# Patient Record
Sex: MALE | Race: WHITE | NOT HISPANIC OR LATINO | Employment: FULL TIME | URBAN - METROPOLITAN AREA
[De-identification: names, ages, dates, MRNs, and addresses within clinical notes are randomized per-mention and may not be internally consistent; named-entity substitution may affect disease eponyms.]

---

## 2021-12-16 ENCOUNTER — OFFICE VISIT (OUTPATIENT)
Dept: URGENT CARE | Facility: PHYSICIAN GROUP | Age: 41
End: 2021-12-16
Payer: COMMERCIAL

## 2021-12-16 VITALS
OXYGEN SATURATION: 97 % | BODY MASS INDEX: 28.7 KG/M2 | TEMPERATURE: 97.1 F | HEART RATE: 60 BPM | SYSTOLIC BLOOD PRESSURE: 122 MMHG | DIASTOLIC BLOOD PRESSURE: 58 MMHG | HEIGHT: 71 IN | RESPIRATION RATE: 16 BRPM | WEIGHT: 205 LBS

## 2021-12-16 DIAGNOSIS — R21 SKIN RASH: ICD-10-CM

## 2021-12-16 DIAGNOSIS — T78.40XA ALLERGIC REACTION, INITIAL ENCOUNTER: ICD-10-CM

## 2021-12-16 DIAGNOSIS — L03.116 CELLULITIS OF LEFT LOWER EXTREMITY: ICD-10-CM

## 2021-12-16 PROCEDURE — 99214 OFFICE O/P EST MOD 30 MIN: CPT | Performed by: STUDENT IN AN ORGANIZED HEALTH CARE EDUCATION/TRAINING PROGRAM

## 2021-12-16 RX ORDER — CLINDAMYCIN HYDROCHLORIDE 300 MG/1
CAPSULE ORAL
COMMUNITY
Start: 2021-12-14 | End: 2021-12-16

## 2021-12-16 RX ORDER — CEPHALEXIN 500 MG/1
500 CAPSULE ORAL 4 TIMES DAILY
Qty: 20 CAPSULE | Refills: 0 | Status: SHIPPED | OUTPATIENT
Start: 2021-12-16 | End: 2021-12-21

## 2021-12-16 RX ORDER — TRIAMCINOLONE ACETONIDE 1 MG/G
15 CREAM TOPICAL 2 TIMES DAILY
Qty: 1 EACH | Refills: 0 | Status: SHIPPED | OUTPATIENT
Start: 2021-12-16 | End: 2021-12-23

## 2021-12-16 RX ORDER — DOXYCYCLINE 100 MG/1
100 CAPSULE ORAL 2 TIMES DAILY
Qty: 10 CAPSULE | Refills: 0 | Status: SHIPPED | OUTPATIENT
Start: 2021-12-16 | End: 2021-12-21

## 2021-12-16 RX ORDER — PREDNISONE 5 MG/1
40 TABLET ORAL DAILY
Qty: 40 TABLET | Refills: 0 | Status: SHIPPED | OUTPATIENT
Start: 2021-12-16 | End: 2021-12-21

## 2021-12-16 RX ORDER — LORATADINE 10 MG/1
10 CAPSULE, LIQUID FILLED ORAL DAILY
Qty: 14 CAPSULE | Refills: 0 | Status: SHIPPED | OUTPATIENT
Start: 2021-12-16 | End: 2021-12-30

## 2021-12-16 ASSESSMENT — ENCOUNTER SYMPTOMS
MYALGIAS: 1
NAUSEA: 0
HEADACHES: 0
SORE THROAT: 0
COUGH: 0
EYE PAIN: 0
FEVER: 0
VOMITING: 0

## 2021-12-16 NOTE — PROGRESS NOTES
"Subjective     Kassie Garner is a 41 y.o. male who presents with Rash (right hand, back of legs, torso; 1x wk) and Rash (left shin; 4x days)            Rash  Pertinent negatives include no cough, eye pain, fever, sore throat or vomiting.   Patient went to [place] for a hiking trip and stayed in a hotel 12/1-12/4. He thought he may have some bug bites over there. He developed itching papules like rash on his left lower leg and R wrist the next day. His pcp prescribed him prednisone. However his rash seemed to get bigger the next day so he stopped it. However the rash has been spread and his LLE was more swelling and painful. His PCP prescribed po clindamycin and  Topical  Mupirocin 2 days ago. However, his rash continue to getting worse and developed vesicles on the top of rash of LLE. He also notices papules like rash on his inguinal area, lower legs, dorsal hand and itching. He reported body aches  No f/c/n/v, HA, cough, sore throat, sob, congestion, ear pain, numbness,   No hx of allergies      Review of Systems   Constitutional: Negative for fever.   HENT: Negative for ear pain and sore throat.    Eyes: Negative for pain.   Respiratory: Negative for cough.    Cardiovascular: Negative for chest pain.   Gastrointestinal: Negative for nausea and vomiting.   Genitourinary: Negative for dysuria.   Musculoskeletal: Positive for myalgias.   Skin: Positive for itching and rash.   Neurological: Negative for headaches.              Objective     /58 (BP Location: Left arm, Patient Position: Sitting, BP Cuff Size: Adult)   Pulse 60   Temp 36.2 °C (97.1 °F) (Temporal)   Resp 16   Ht 1.803 m (5' 11\")   Wt 93 kg (205 lb)   SpO2 97%   BMI 28.59 kg/m²      Physical Exam  HENT:      Head: Normocephalic.      Nose: Nose normal.      Mouth/Throat:      Mouth: Mucous membranes are moist.   Eyes:      Extraocular Movements: Extraocular movements intact.   Cardiovascular:      Rate and Rhythm: Normal rate.      " Pulses: Normal pulses.   Pulmonary:      Effort: Pulmonary effort is normal.   Abdominal:      Palpations: Abdomen is soft.   Musculoskeletal:         General: Normal range of motion.      Cervical back: Normal range of motion.   Skin:     General: Skin is warm.             Comments: LLE Erythema, swelling and tenderness, Vesicles.   Red papules on dorsal hands (one linear rash), BL ingular area, lower legs   Neurological:      General: No focal deficit present.      Mental Status: He is alert.                             Assessment & Plan        1. Cellulitis of left lower extremity  Worsening skin rash/swelling with clindamycin p.o. and topical mupirocin.  Discontinue mupirocin and clindamycin.  Start new antibiotics.  Patient was advised to go to ER if no improvement.  He may need IV antibiotics if he fails outpatient  treatment    - cephALEXin (KEFLEX) 500 MG Cap; Take 1 Capsule by mouth 4 times a day for 5 days.  Dispense: 20 Capsule; Refill: 0  - doxycycline (MONODOX) 100 MG capsule; Take 1 Capsule by mouth 2 times a day for 5 days.  Dispense: 10 Capsule; Refill: 0    2. Skin rash  History of recent outdoor exposure to possible bugs/ iv poison  Still develop new rash 10 days after the exposure.   Possible allergy to unknown bugs/plants, possible allergy to clindamycin   Advised the patient to stay hygiene, sterilize beddings, clothes, avoid new detergent  Discontinue clindamycin    - triamcinolone acetonide (KENALOG) 0.1 % Cream; Apply 15 g topically 2 times a day for 7 days.  Dispense: 1 Each; Refill: 0  - Loratadine (CLARITIN) 10 MG Cap; Take 10 mg by mouth every day for 14 days.  Dispense: 14 Capsule; Refill: 0  - predniSONE (DELTASONE) 5 MG Tab; Take 8 Tablets by mouth every day for 5 days.  Dispense: 40 Tablet; Refill: 0    3. Allergic reaction, initial encounter  Worsening erythema and new developed vesicles after topic mupirocin use.  Advised the patient  to stop using it.  Prescribed 5 days of  prednisone.     - predniSONE (DELTASONE) 5 MG Tab; Take 8 Tablets by mouth every day for 5 days.  Dispense: 40 Tablet; Refill: 0    Patient was advised to go to ER or urgent care if symptoms are not improved or getting worse            encephalopathy. improving.  continue present care. 24 video EEG.  consider MRI brain wo gabe.

## 2024-09-27 ENCOUNTER — HOSPITAL ENCOUNTER (OUTPATIENT)
Facility: MEDICAL CENTER | Age: 44
End: 2024-09-27
Attending: PHYSICIAN ASSISTANT
Payer: COMMERCIAL

## 2024-09-27 ENCOUNTER — OFFICE VISIT (OUTPATIENT)
Dept: URGENT CARE | Facility: CLINIC | Age: 44
End: 2024-09-27
Payer: COMMERCIAL

## 2024-09-27 VITALS
WEIGHT: 177 LBS | RESPIRATION RATE: 16 BRPM | BODY MASS INDEX: 25.34 KG/M2 | DIASTOLIC BLOOD PRESSURE: 68 MMHG | HEART RATE: 60 BPM | HEIGHT: 70 IN | OXYGEN SATURATION: 99 % | SYSTOLIC BLOOD PRESSURE: 118 MMHG | TEMPERATURE: 97.9 F

## 2024-09-27 DIAGNOSIS — N50.89 MALE GENITAL LESION: ICD-10-CM

## 2024-09-27 PROCEDURE — 99213 OFFICE O/P EST LOW 20 MIN: CPT | Performed by: PHYSICIAN ASSISTANT

## 2024-09-27 PROCEDURE — 3078F DIAST BP <80 MM HG: CPT | Performed by: PHYSICIAN ASSISTANT

## 2024-09-27 PROCEDURE — 87529 HSV DNA AMP PROBE: CPT

## 2024-09-27 PROCEDURE — 3074F SYST BP LT 130 MM HG: CPT | Performed by: PHYSICIAN ASSISTANT

## 2024-09-27 RX ORDER — DOXYCYCLINE 100 MG/1
100 CAPSULE ORAL 2 TIMES DAILY
COMMUNITY
Start: 2024-09-11 | End: 2024-09-18

## 2024-09-27 RX ORDER — VALACYCLOVIR HYDROCHLORIDE 1 G/1
1000 TABLET, FILM COATED ORAL 2 TIMES DAILY
Qty: 14 TABLET | Refills: 1 | Status: SHIPPED | OUTPATIENT
Start: 2024-09-27 | End: 2024-10-04

## 2024-09-27 ASSESSMENT — ENCOUNTER SYMPTOMS
VOMITING: 0
EYE DISCHARGE: 0
NAUSEA: 0
EYE REDNESS: 0
FEVER: 0

## 2024-09-27 NOTE — PROGRESS NOTES
Subjective     Kassie Garner is a 44 y.o. male who presents with Sore (Sores in genital area x 1 month/Was tested for STIs but came back negative)            HPI    This is a new problem.   The patient presents to clinic complaining of genital lesions x 3-4 weeks.   The patient states he developed blisterlike lesions on the shaft of his penis approximately 3-4 weeks ago.  The patient states he was seen by his PCP on 9/7/2024, and had STD screening done at that time.  The patient states he was tested for chlamydia and gonorrhea via a urine sample. The patient  states he also had blood work done to test for HSV, HIV, and syphilis.  The patient states all of his STD screening came back negative.  The patient states the blister-like lesions have mostly healed over the past several weeks. However, he has developed a new blister lesion. The patient states he did attend [facility] and had increased sexual intercourse with 1 female partner. The patient reports no urinary symptoms.  No dysuria.  No hematuria.  He also reports no penile discharge.  No testicular pain/swelling.  The patient states he was experiencing an intermittent pain/soreness to his urethra that would happen at random and was not associated with urination.  The patient states his primary care provider did prescribe doxycycline twice daily for 7 days, which she completed approximately 8 days ago.  The patient reports no prior history of herpes.  The patient has not taken any OTC medications for his current symptoms.    PMH:  has no past medical history on file.  MEDS:   Current Outpatient Medications:     amoxapine (ASENDIN) 25 MG TABS, Take 25 mg by mouth every evening. (Patient not taking: Reported on 9/27/2024), Disp: , Rfl:   ALLERGIES: No Known Allergies  SURGHX: History reviewed. No pertinent surgical history.  SOCHX:  reports that he has never smoked. He has never used smokeless tobacco. He reports that he does not currently use alcohol. He  "reports that he does not currently use drugs.  FH: Family history was reviewed, no pertinent findings to report    Review of Systems   Constitutional:  Negative for fever.   Eyes:  Negative for discharge and redness.   Gastrointestinal:  Negative for nausea and vomiting.   Genitourinary:  Negative for dysuria and hematuria.              Objective     /68 (BP Location: Left arm, Patient Position: Sitting, BP Cuff Size: Adult)   Pulse 60   Temp 36.6 °C (97.9 °F) (Temporal)   Resp 16   Ht 1.778 m (5' 10\")   Wt 80.3 kg (177 lb)   SpO2 99%   BMI 25.40 kg/m²      Physical Exam  Constitutional:       General: He is not in acute distress.     Appearance: Normal appearance. He is well-developed. He is not ill-appearing.   HENT:      Head: Normocephalic and atraumatic.      Right Ear: External ear normal.      Left Ear: External ear normal.   Eyes:      Extraocular Movements: Extraocular movements intact.      Conjunctiva/sclera: Conjunctivae normal.   Cardiovascular:      Rate and Rhythm: Normal rate.   Pulmonary:      Effort: Pulmonary effort is normal.   Genitourinary:     Comments:   A single vesicular lesion was present to the shaft of the penis with slight surrounding erythema.  No additional lesions.  No pain/tenderness.  No swelling.  No erythema.  No secondary signs of infection.  A chaperone was present throughout the duration of the  exam.  Musculoskeletal:         General: Normal range of motion.      Cervical back: Normal range of motion and neck supple.   Skin:     General: Skin is warm and dry.   Neurological:      Mental Status: He is alert and oriented to person, place, and time.                  Progress:  HSV PCR - pending     HSV IGG - pending              Assessment & Plan        Assessment & Plan  Male genital lesion    Orders:    HSV-1 AND HSV-2 SUBTYPE, PCR; Future    HSV 1/2 IGG W/ TYPE SPECIFIC RFLX; Future    valacyclovir (VALTREX) 1 GM Tab; Take 1 Tablet by mouth 2 times a day for 7 " days.            The patient's presenting symptoms and physical exam findings are consistent with a genital lesion.  The patient's general physical exam today in clinic was normal.  On the patient's  exam, a single vesicular lesion was present to the shaft of the penis with slight surrounding erythema.  The patient had no additional lesions.  He also had no pain/tenderness, swelling, erythema, or secondary signs of infection.  A chaperone was present throughout the duration of the patient's  exam.  The patient is nontoxic and appears in no acute distress.  The patient's vital signs are stable and within normal limits.  He is afebrile today in clinic.  A viral swab was obtained today in clinic to further evaluate the patient's vesicular lesion.  Based on the patient's presenting symptoms and physical exam findings, I do believe the patient's vesicular lesion is likely related to HSV.  The patient was previously tested for HSV IgG antibodies, which was negative.  This could be that the patient was tested to early.  Will reorder HSV antibody testing should the patient's PCR testing come back negative.  In the meantime, will prescribe the patient valacyclovir for his likely HSV infection.  Advised the patient to abstain from sexual intercourse until all of the lesions have completely healed.  Advised the patient to monitor for worsening signs or symptoms.  The patient was previously tested for chlamydia/gonorrhea, HIV, and syphilis, all of which were negative.  I do not believe additional STD screening is indicated at this time.  Recommend OTC medications and supportive care for symptomatic management.  Recommend patient follow-up with primary care as needed.  Discussed return precautions with the patient, and he verbalized understanding.    Differential diagnoses, supportive care, and indications for immediate follow-up discussed with patient.   Instructed to return to clinic or nearest emergency department for any  change in condition, further concerns, or worsening of symptoms.    I personally reviewed prior external notes and test results pertinent to today's visit.  I have independently reviewed and interpreted all diagnostics ordered during this urgent care visit.     Please note that this dictation was created using voice recognition software. I have made every reasonable attempt to correct obvious errors, but I expect that there may be errors of grammar and possibly content that I did not discover before finalizing the note.     This note was electronically signed by Bianca Watson PA-C

## 2024-09-30 LAB
HSV1 DNA CSF QL NAA+PROBE: NOT DETECTED
HSV2 DNA CSF QL NAA+PROBE: DETECTED
SPECIMEN SOURCE: ABNORMAL

## 2024-10-03 ENCOUNTER — TELEPHONE (OUTPATIENT)
Dept: URGENT CARE | Facility: CLINIC | Age: 44
End: 2024-10-03
Payer: COMMERCIAL

## 2024-10-03 DIAGNOSIS — Z11.3 SCREENING FOR STDS (SEXUALLY TRANSMITTED DISEASES): ICD-10-CM
